# Patient Record
Sex: FEMALE | Race: WHITE | NOT HISPANIC OR LATINO | RURAL
[De-identification: names, ages, dates, MRNs, and addresses within clinical notes are randomized per-mention and may not be internally consistent; named-entity substitution may affect disease eponyms.]

---

## 2017-01-07 ENCOUNTER — OFFICE VISIT (OUTPATIENT)
Dept: RETAIL CLINIC | Facility: CLINIC | Age: 22
End: 2017-01-07

## 2017-01-07 VITALS
SYSTOLIC BLOOD PRESSURE: 130 MMHG | HEART RATE: 77 BPM | RESPIRATION RATE: 16 BRPM | DIASTOLIC BLOOD PRESSURE: 80 MMHG | HEIGHT: 61 IN | OXYGEN SATURATION: 98 % | BODY MASS INDEX: 33.04 KG/M2 | TEMPERATURE: 98.1 F | WEIGHT: 175 LBS

## 2017-01-07 DIAGNOSIS — J01.90 ACUTE SINUSITIS, RECURRENCE NOT SPECIFIED, UNSPECIFIED LOCATION: Primary | ICD-10-CM

## 2017-01-07 DIAGNOSIS — J06.9 ACUTE URI: ICD-10-CM

## 2017-01-07 DIAGNOSIS — R06.2 WHEEZE: ICD-10-CM

## 2017-01-07 PROCEDURE — 99213 OFFICE O/P EST LOW 20 MIN: CPT | Performed by: NURSE PRACTITIONER

## 2017-01-07 RX ORDER — ALBUTEROL SULFATE 90 UG/1
2 AEROSOL, METERED RESPIRATORY (INHALATION) EVERY 4 HOURS PRN
Qty: 18 G | Refills: 0 | Status: SHIPPED | OUTPATIENT
Start: 2017-01-07

## 2017-01-07 RX ORDER — AZITHROMYCIN 250 MG/1
TABLET, FILM COATED ORAL
Qty: 6 TABLET | Refills: 0 | Status: SHIPPED | OUTPATIENT
Start: 2017-01-07 | End: 2017-02-08

## 2017-01-07 RX ORDER — BROMPHENIRAMINE MALEATE, PSEUDOEPHEDRINE HYDROCHLORIDE, AND DEXTROMETHORPHAN HYDROBROMIDE 2; 30; 10 MG/5ML; MG/5ML; MG/5ML
SYRUP ORAL
Qty: 240 ML | Refills: 0 | Status: SHIPPED | OUTPATIENT
Start: 2017-01-07 | End: 2017-02-08

## 2017-01-07 NOTE — MR AVS SNAPSHOT
Kari Rader   1/7/2017 9:30 AM   Office Visit    Dept Phone:  [phone]   Encounter #:  [id number]    Provider:  TRACEY DELGADO   Department:  [facility]                Your Full Care Plan              Today's Medication Changes          These changes are accurate as of: 1/7/17 10:09 AM.  If you have any questions, ask your nurse or doctor.               New Medication(s)Ordered:     albuterol 108 (90 BASE) MCG/ACT inhaler   Commonly known as:  PROVENTIL HFA;VENTOLIN HFA   Inhale 2 puffs Every 4 (Four) Hours As Needed for wheezing.       azithromycin 250 MG tablet   Commonly known as:  ZITHROMAX Z-MARIAA   Take 2 tablets the first day, then 1 tablet daily for 4 days.       brompheniramine-pseudoephedrine-DM 30-2-10 MG/5ML syrup   2 tsp every 4-6 hours PRN cough         Stop taking medication(s)listed here:     MethylPREDNISolone 4 MG tablet   Commonly known as:  MEDROL (MARIAA)                Where to Get Your Medications      These medications [facility] to [facility] [address] [facility]  [phone] [facility] [phone]   [address], [facility] [address]     Phone:  [phone]     albuterol 108 (90 BASE) MCG/ACT inhaler    azithromycin 250 MG tablet    brompheniramine-pseudoephedrine-DM 30-2-10 MG/5ML syrup                  Your Updated Medication List          This list is accurate as of: 1/7/17 10:09 AM.  Always use your most recent med list.                albuterol 108 (90 BASE) MCG/ACT inhaler   Commonly known as:  PROVENTIL HFA;VENTOLIN HFA   Inhale 2 puffs Every 4 (Four) Hours As Needed for wheezing.       azithromycin 250 MG tablet   Commonly known as:  ZITHROMAX Z-MARIAA   Take 2 tablets the first day, then 1 tablet daily for 4 days.       brompheniramine-pseudoephedrine-DM 30-2-10 MG/5ML syrup   2 tsp every 4-6 hours PRN cough       fexofenadine-pseudoephedrine 180-240 MG per 24 hr tablet      Commonly known as:  ALLEGRA-D ALLERGY & CONGESTION   Take 1 tablet by mouth Daily.       hydrOXYzine 25 MG capsule   Commonly known as:  VISTARIL   1-2 caps po QHS prn itching               You Were Diagnosed With        Codes Comments    Acute sinusitis, recurrence not specified, unspecified location    -  Primary ICD-10-CM: J01.90  ICD-9-CM: 461.9     Acute URI     ICD-10-CM: J06.9  ICD-9-CM: 465.9     Wheeze     ICD-10-CM: R06.2  ICD-9-CM: 786.07       Instructions    Sinusitis, Adult  Sinusitis is redness, soreness, and inflammation of the paranasal sinuses. Paranasal sinuses are air pockets within the bones of your face. They are located beneath your eyes, in the middle of your forehead, and above your eyes. In healthy paranasal sinuses, mucus is able to drain out, and air is able to circulate through them by way of your nose. However, when your paranasal sinuses are inflamed, mucus and air can become trapped. This can allow bacteria and other germs to grow and cause infection.  Sinusitis can develop quickly and last only a short time (acute) or continue over a long period (chronic). Sinusitis that lasts for more than 12 weeks is considered chronic.  CAUSES  Causes of sinusitis include:  · Allergies.  · Structural abnormalities, such as displacement of the cartilage that separates your nostrils (deviated septum), which can decrease the air flow through your nose and sinuses and affect sinus drainage.  · Functional abnormalities, such as when the small hairs (cilia) that line your sinuses and help remove mucus do not work properly or are not present.  SIGNS AND SYMPTOMS  Symptoms of acute and chronic sinusitis are the same. The primary symptoms are pain and pressure around the affected sinuses. Other symptoms include:  · Upper toothache.  · Earache.  · Headache.  · Bad breath.  · Decreased sense of smell and taste.  · A cough, which worsens when you are lying flat.  · Fatigue.  · Fever.  · Thick drainage from  your nose, which often is green and may contain pus (purulent).  · Swelling and warmth over the affected sinuses.  DIAGNOSIS  Your health care provider will perform a physical exam. During your exam, your health care provider may perform any of the following to help determine if you have acute sinusitis or chronic sinusitis:  · Look in your nose for signs of abnormal growths in your nostrils (nasal polyps).  · Tap over the affected sinus to check for signs of infection.  · View the inside of your sinuses using an imaging device that has a light attached (endoscope).  If your health care provider suspects that you have chronic sinusitis, one or more of the following tests may be recommended:  · Allergy tests.  · Nasal culture. A sample of mucus is taken from your nose, sent to a lab, and screened for bacteria.  · Nasal cytology. A sample of mucus is taken from your nose and examined by your health care provider to determine if your sinusitis is related to an allergy.  TREATMENT  Most cases of acute sinusitis are related to a viral infection and will resolve on their own within 10 days. Sometimes, medicines are prescribed to help relieve symptoms of both acute and chronic sinusitis. These may include pain medicines, decongestants, nasal steroid sprays, or saline sprays.  However, for sinusitis related to a bacterial infection, your health care provider will prescribe antibiotic medicines. These are medicines that will help kill the bacteria causing the infection.  Rarely, sinusitis is caused by a fungal infection. In these cases, your health care provider will prescribe antifungal medicine.  For some cases of chronic sinusitis, surgery is needed. Generally, these are cases in which sinusitis recurs more than 3 times per year, despite other treatments.  HOME CARE INSTRUCTIONS  · Drink plenty of water. Water helps thin the mucus so your sinuses can drain more easily.  · Use a humidifier.  · Inhale steam 3-4 times a day  (for example, sit in the bathroom with the shower running).  · Apply a warm, moist washcloth to your face 3-4 times a day, or as directed by your health care provider.  · Use saline nasal sprays to help moisten and clean your sinuses.  · Take medicines only as directed by your health care provider.  · If you were prescribed either an antibiotic or antifungal medicine, finish it all even if you start to feel better.  SEEK IMMEDIATE MEDICAL CARE IF:  · You have increasing pain or severe headaches.  · You have nausea, vomiting, or drowsiness.  · You have swelling around your face.  · You have vision problems.  · You have a stiff neck.  · You have difficulty breathing.     This information is not intended to replace advice given to you by your health care provider. Make sure you discuss any questions you have with your health care provider.     Document Released: 2006 Document Revised: 2016 Document Reviewed: 2013  [employer] Interactive Patient Education © [employer]         Patient Instructions History      Upcoming Appointments     Visit Type Date Time Department    OFFICE VISIT 2017  9:30 AM [facility]      [employer] Signup     [employer] allows you to send messages to your doctor, view your test results, renew your prescriptions, schedule appointments, and more. To sign up, go to [employer] and click on the Sign Up Now link in the New User? box. Enter your MEEP Activation Code exactly as it appears below along with the last four digits of your Social Security Number and your Date of Birth () to complete the sign-up process. If you do not sign up before the expiration date, you must request a new code.    MEEP Activation Code: [email or web address]  Expires: 2017 10:09 AM    If you have questions, you can email [email or web address] or call [phone] to talk to our MEEP staff. Remember, MEEP is NOT to be used for urgent needs.  "For medical emergencies, dial 911.               Other Info from Your Visit           Allergies     Penicillins      Sulfa Antibiotics        Reason for Visit     Sinusitis cough shortness of breath wheezing ears sinus pressure       Vital Signs     Blood Pressure Pulse Temperature Respirations Height Weight    130/80 77 98.1 °F (36.7 °C) (Tympanic) 16 61\" (154.9 cm) 175 lb (79.4 kg)    Oxygen Saturation Breastfeeding? Body Mass Index Smoking Status          98% No 33.07 kg/m2 Never Smoker        Problems and Diagnoses Noted     Acute sinus infection    -  Primary    Acute upper respiratory infection        Wheeze            "

## 2017-01-07 NOTE — PROGRESS NOTES
"Subjective   Kari Rader is a 21 y.o. female.     Sinusitis   This is a new problem. The current episode started in the past 7 days. The problem has been gradually worsening since onset. Maximum temperature: unmeasured. Associated symptoms include congestion, coughing, ear pain (\"pressure\" bilaterally ), headaches, shortness of breath (with coughing episode), sinus pressure and a sore throat (\"a little\"). Pertinent negatives include no chills. Past treatments include nothing.        The following portions of the patient's history were reviewed and updated as appropriate: allergies, current medications, past family history, past medical history, past social history, past surgical history and problem list.    Review of Systems   Constitutional: Positive for fatigue (\"some\"). Negative for chills and fever.   HENT: Positive for congestion, ear pain (\"pressure\" bilaterally ), postnasal drip, rhinorrhea, sinus pressure and sore throat (\"a little\").    Respiratory: Positive for cough, chest tightness, shortness of breath (with coughing episode) and wheezing (\"mostly while at work\").    Cardiovascular: Negative for chest pain and palpitations.   Gastrointestinal: Negative for diarrhea, nausea and vomiting.   Musculoskeletal: Negative for arthralgias.   Neurological: Positive for headaches. Negative for dizziness.   Psychiatric/Behavioral: Negative for agitation and confusion.       Objective   Physical Exam   Constitutional: She is oriented to person, place, and time. Vital signs are normal. She appears well-developed and well-nourished. She is cooperative.   HENT:   Head: Normocephalic.   Right Ear: Hearing and tympanic membrane normal.   Left Ear: Hearing and tympanic membrane normal.   Nose: Mucosal edema present. Right sinus exhibits maxillary sinus tenderness and frontal sinus tenderness. Left sinus exhibits maxillary sinus tenderness and frontal sinus tenderness.   Mouth/Throat: Mucous membranes are normal. " "Posterior oropharyngeal erythema (Clear PND) present.   Eyes: Lids are normal. Pupils are equal, round, and reactive to light.   Neck: Normal range of motion.   Cardiovascular: Normal rate, regular rhythm and normal heart sounds.    Pulmonary/Chest: Effort normal and breath sounds normal. She has no wheezes. She has no rhonchi.   Abdominal: Soft. Normal appearance and bowel sounds are normal. There is no tenderness.   Lymphadenopathy:     She has no cervical adenopathy.   Neurological: She is alert and oriented to person, place, and time.   Skin: Skin is warm, dry and intact.   Patient appears to have white nits to hair shafts about 1/2 inch from scalp    Psychiatric: She has a normal mood and affect. Her speech is normal and behavior is normal.   Nursing note and vitals reviewed.    Visit Vitals   • /80   • Pulse 77   • Temp 98.1 °F (36.7 °C) (Tympanic)   • Resp 16   • Ht 61\" (154.9 cm)   • Wt 175 lb (79.4 kg)   • SpO2 98%   • Breastfeeding No   • BMI 33.07 kg/m2       Assessment/Plan   Kari was seen today for sinusitis.    Diagnoses and all orders for this visit:    Acute sinusitis, recurrence not specified, unspecified location  -     azithromycin (ZITHROMAX Z-MARIAA) 250 MG tablet; Take 2 tablets the first day, then 1 tablet daily for 4 days.    Acute URI  -     brompheniramine-pseudoephedrine-DM 30-2-10 MG/5ML syrup; 2 tsp every 4-6 hours PRN cough    Wheeze  -     albuterol (PROVENTIL HFA;VENTOLIN HFA) 108 (90 BASE) MCG/ACT inhaler; Inhale 2 puffs Every 4 (Four) Hours As Needed for wheezing.    Other orders  -     permethrin (NIX CREME RINSE) 1 % liquid; Apply  topically 1 (One) Time for 1 dose. Apply x 1,  may repeat in 9-10 days if needed      Tylenol and Ibuprofen OTC for mild to moderate pain and fever     · Drink plenty of water. Water helps thin the mucus so your sinuses can drain more easily.  · Use a cold/warm mist humidifier PRN  · Use saline nasal sprays to help moisten and clean your " sinuses.  · Take medicines as directed by your health care provider        Discharge Plan:     F/u with PCP in 3-4 days if symptoms worsen or do not improve.  Discussed diagnosis with patient. Verbalized understanding of dx and treatment plan     Patient instructions printed and given to patient     No work/ school noted needed.

## 2017-02-08 ENCOUNTER — OFFICE VISIT (OUTPATIENT)
Dept: RETAIL CLINIC | Facility: CLINIC | Age: 22
End: 2017-02-08

## 2017-02-08 VITALS
HEIGHT: 61 IN | OXYGEN SATURATION: 98 % | BODY MASS INDEX: 31.34 KG/M2 | RESPIRATION RATE: 18 BRPM | HEART RATE: 91 BPM | TEMPERATURE: 99.5 F | WEIGHT: 166 LBS

## 2017-02-08 DIAGNOSIS — K52.9 GASTROENTERITIS: Primary | ICD-10-CM

## 2017-02-08 PROCEDURE — 99213 OFFICE O/P EST LOW 20 MIN: CPT | Performed by: NURSE PRACTITIONER

## 2017-02-08 RX ORDER — PROMETHAZINE HYDROCHLORIDE 25 MG/1
TABLET ORAL
Qty: 12 TABLET | Refills: 0 | Status: SHIPPED | OUTPATIENT
Start: 2017-02-08

## 2017-02-08 RX ORDER — PROMETHAZINE HYDROCHLORIDE 25 MG/1
TABLET ORAL
Qty: 12 TABLET | Refills: 0 | Status: SHIPPED | OUTPATIENT
Start: 2017-02-08 | End: 2017-02-08

## 2017-02-08 NOTE — PROGRESS NOTES
"Subjective   Kari Rader is a 21 y.o. female.     Vomiting    This is a new problem. The current episode started yesterday. The problem occurs 2 to 4 times per day. The problem has been gradually improving. The emesis has an appearance of stomach contents. Maximum temperature: 99.5. Associated symptoms include abdominal pain ( generalized), chills, diarrhea ( watery), dizziness, a fever ( low grade) and sweats. Pertinent negatives include no arthralgias, chest pain, coughing, headaches, myalgias, URI or weight loss. Risk factors include ill contacts ( recently had stomach flu). She has tried nothing for the symptoms. Improvement on treatment: was able to keep down sips of sprite this am.        The following portions of the patient's history were reviewed and updated as appropriate: allergies, current medications, past medical history and past social history.    Review of Systems   Constitutional: Positive for chills and fever ( low grade). Negative for weight loss.   HENT: Positive for ear pain ( mild). Negative for congestion and sore throat.    Eyes: Negative.    Respiratory: Negative for cough, chest tightness and wheezing.    Cardiovascular: Negative.  Negative for chest pain.   Gastrointestinal: Positive for abdominal pain ( generalized), diarrhea ( watery), nausea and vomiting. Negative for blood in stool.   Musculoskeletal: Negative for arthralgias, myalgias, neck pain and neck stiffness.   Skin: Negative.    Neurological: Positive for dizziness. Negative for headaches.   Hematological: Negative for adenopathy.   Psychiatric/Behavioral: Negative.        Objective    Visit Vitals   • Pulse 91   • Temp 99.5 °F (37.5 °C) (Tympanic)   • Resp 18   • Ht 61\" (154.9 cm)   • Wt 166 lb (75.3 kg)   • LMP 01/21/2017 (Exact Date)   • SpO2 98%   • Breastfeeding No   • BMI 31.37 kg/m2       Physical Exam   Constitutional: She is oriented to person, place, and time. She appears well-developed. Distressed:  " does not appear to feel well.   HENT:   Head: Normocephalic and atraumatic.   Right Ear: Ear canal normal. A middle ear effusion ( small, clear fluid) is present.   Left Ear: Tympanic membrane and ear canal normal.   Mouth/Throat: Oropharynx is clear and moist.   Eyes: Conjunctivae are normal.   Neck: Normal range of motion. Neck supple.   Cardiovascular: Normal rate and regular rhythm.    Pulmonary/Chest: Effort normal and breath sounds normal.   Abdominal: Soft. There is tenderness ( generalized TTP, no localized pain, guarding or rebound).   Bowel sounds hyperactive     Lymphadenopathy:     She has no cervical adenopathy.   Neurological: She is alert and oriented to person, place, and time.   Psychiatric: She has a normal mood and affect. Her behavior is normal.   Nursing note and vitals reviewed.      Assessment/Plan   Kari was seen today for vomiting.    Diagnoses and all orders for this visit:    Gastroenteritis  -     Discontinue: promethazine (PHENERGAN) 25 MG tablet; 1/2 to 1 tab po every 6 hours prn n/v  -     promethazine (PHENERGAN) 25 MG tablet; 1/2 to 1 tab po every 6 hours prn n/v      Clear liquids advancing to bland, BRAT diet later this evening and continue until you have a normal BM.  Tylenol as needed    Urgent care or ER if symptoms persist/worsen     RTW: 2-9-17--call if one day extension is needed

## 2017-02-08 NOTE — PATIENT INSTRUCTIONS
Viral Gastroenteritis  Viral gastroenteritis is also known as stomach flu. This condition affects the stomach and intestinal tract. It can cause sudden diarrhea and vomiting. The illness typically lasts 3 to 8 days. Most people develop an immune response that eventually gets rid of the virus. While this natural response develops, the virus can make you quite ill.  CAUSES   Many different viruses can cause gastroenteritis, such as rotavirus or noroviruses. You can catch one of these viruses by consuming contaminated food or water. You may also catch a virus by sharing utensils or other personal items with an infected person or by touching a contaminated surface.  SYMPTOMS   The most common symptoms are diarrhea and vomiting. These problems can cause a severe loss of body fluids (dehydration) and a body salt (electrolyte) imbalance. Other symptoms may include:  · Fever.  · Headache.  · Fatigue.  · Abdominal pain.  DIAGNOSIS   Your caregiver can usually diagnose viral gastroenteritis based on your symptoms and a physical exam. A stool sample may also be taken to test for the presence of viruses or other infections.  TREATMENT   This illness typically goes away on its own. Treatments are aimed at rehydration. The most serious cases of viral gastroenteritis involve vomiting so severely that you are not able to keep fluids down. In these cases, fluids must be given through an intravenous line (IV).  HOME CARE INSTRUCTIONS   · Drink enough fluids to keep your urine clear or pale yellow. Drink small amounts of fluids frequently and increase the amounts as tolerated.  · Ask your caregiver for specific rehydration instructions.  · Avoid:    Foods high in sugar.    Alcohol.    Carbonated drinks.    Tobacco.    Juice.    Caffeine drinks.    Extremely hot or cold fluids.    Fatty, greasy foods.    Too much intake of anything at one time.    Dairy products until 24 to 48 hours after diarrhea stops.  · You may consume probiotics.  Probiotics are active cultures of beneficial bacteria. They may lessen the amount and number of diarrheal stools in adults. Probiotics can be found in yogurt with active cultures and in supplements.  · Wash your hands well to avoid spreading the virus.  · Only take over-the-counter or prescription medicines for pain, discomfort, or fever as directed by your caregiver. Do not give aspirin to children. Antidiarrheal medicines are not recommended.  · Ask your caregiver if you should continue to take your regular prescribed and over-the-counter medicines.  · Keep all follow-up appointments as directed by your caregiver.  SEEK IMMEDIATE MEDICAL CARE IF:   · You are unable to keep fluids down.  · You do not urinate at least once every 6 to 8 hours.  · You develop shortness of breath.  · You notice blood in your stool or vomit. This may look like coffee grounds.  · You have abdominal pain that increases or is concentrated in one small area (localized).  · You have persistent vomiting or diarrhea.  · You have a fever.  · The patient is a child younger than 3 months, and he or she has a fever.  · The patient is a child older than 3 months, and he or she has a fever and persistent symptoms.  · The patient is a child older than 3 months, and he or she has a fever and symptoms suddenly get worse.  · The patient is a baby, and he or she has no tears when crying.  MAKE SURE YOU:   · Understand these instructions.  · Will watch your condition.  · Will get help right away if you are not doing well or get worse.     This information is not intended to replace advice given to you by your health care provider. Make sure you discuss any questions you have with your health care provider.     Document Released: 12/18/2006 Document Revised: 03/11/2013 Document Reviewed: 10/03/2012  [employer] Interactive Patient Education ©2016 [employer]

## 2023-05-11 ENCOUNTER — TRANSCRIBE ORDERS (OUTPATIENT)
Dept: PHYSICAL THERAPY | Facility: HOSPITAL | Age: 28
End: 2023-05-11

## 2023-05-11 DIAGNOSIS — M54.40 BACK PAIN OF LUMBAR REGION WITH SCIATICA: Primary | ICD-10-CM

## 2023-06-05 ENCOUNTER — HOSPITAL ENCOUNTER (OUTPATIENT)
Dept: PHYSICAL THERAPY | Facility: HOSPITAL | Age: 28
Setting detail: THERAPIES SERIES
Discharge: HOME | End: 2023-06-05
Payer: COMMERCIAL

## 2023-06-05 DIAGNOSIS — M54.40 BACK PAIN OF LUMBAR REGION WITH SCIATICA: Primary | ICD-10-CM

## 2023-06-05 PROCEDURE — 97162 PT EVAL MOD COMPLEX 30 MIN: CPT | Performed by: PHYSICAL THERAPIST

## 2023-06-05 NOTE — THERAPY EVALUATION
Outpatient Physical Therapy Ortho Initial Evaluation  [facility]     Patient Name: Kari Rader  : 1995  MRN: [id number]  Today's Date: 2023      Visit Date: 2023    Patient seen for 1 PT sessions.  Patient reports N/A% of improvement.  Next MD appt: [facility].  Recertification: 2023.    Therapy Diagnosis: LBP/SI Mechanical Dysfunction         Past Medical History:   Diagnosis Date    Anxiety     Bell's palsy 2023    Depression         Past Surgical History:   Procedure Laterality Date    ABDOMINAL SURGERY         Visit Dx:     ICD-10-CM ICD-9-CM   1. Back pain of lumbar region with sciatica  M54.40 724.2     724.3          Patient History       Row Name 23 1500             History    Chief Complaint Pain  -AJ      Type of Pain Back pain  -AJ      Date Current Problem(s) Began --  Few months ago  -AJ      Brief Description of Current Complaint Patient reprots she had an incident where she fell out of her dad's truck and landed on her hip and back. She repts her back and and pain down l LE since then. She reprots it has been constant since then. She reprots most of it is LB, but then some in leg depending on what she is doing. She reprots she was in the ED because she collaped at work and they never did xrays or scans.  -AJ      Previous treatment for THIS PROBLEM Medication  -AJ      Patient/Caregiver Goals Relieve pain;Know what to do to help the symptoms  -AJ      Current Tobacco Use None  -AJ      Smoking Status NON-smoker  -AJ      Patient's Rating of General Health Good  -AJ      Occupation/sports/leisure activities Occupation: [employer]; Hobbies: fishing, hunting, gardening, spending time with kids  -AJ      What clinical tests have you had for this problem? --  None have been performed  -AJ      History of Previous Related Injuries Fall as described above. No issues prior  -AJ         Pain     Pain Location Back  -AJ      Pain at Present 6  -AJ      Pain at Best 3   -AJ      Pain at Worst 10  -AJ      Pain Frequency Constant/continuous  -AJ      Pain Description Throbbing;Burning;Pins and needles;Stabbing  -AJ      What Performance Factors Make the Current Problem(s) WORSE? Prolonged positions  -AJ      What Performance Factors Make the Current Problem(s) BETTER? keeping moving  -AJ      Tolerance Time- Standing long as keeop moving she is okay  -AJ      Tolerance Time- Sitting ~1 hour  -AJ      Is your sleep disturbed? Yes  -AJ      Is medication used to assist with sleep? Yes  -AJ                User Key  (r) = Recorded By, (t) = Taken By, (c) = Cosigned By      Initials Name Provider Type    AJ Silvana Rodriguez, PT DPT Physical Therapist                     PT Ortho       Row Name 06/05/23 1500       Subjective Comments    Subjective Comments see history  -AJ       Precautions and Contraindications    Precautions/Limitations no known precautions/limitations  -AJ       Subjective Pain    Able to rate subjective pain? yes  -AJ    Pre-Treatment Pain Level 6  -AJ       Posture/Observations    Alignment Options Forward head;Cervical lordosis;Thoracic kyphosis;Lumbar lordosis;Iliac crests  -AJ    Forward Head Normal  -AJ    Cervical Lordosis Normal  -AJ    Thoracic Kyphosis Mild;Increased  -AJ    Lumbar lordosis Normal  -AJ    Iliac crests Left:;Elevated;Posture in bed  L anterior ROt innonimate  -AJ    Posture/Observations Comments No distress, but appears uncomfortable. Poor overall posture awareness.  -       Sensory Screen for Light Touch- Lower Quarter Clearing    L1 (inguinal area) Bilateral:;Intact  -AJ    L2 (anterior mid thigh) Bilateral:;Intact  -AJ    L3 (distal anterior thigh) Bilateral:;Intact  -AJ    L4 (medial lower leg/foot) Bilateral:;Intact  -AJ    L5 (lateral lower leg/great toe) Bilateral:;Intact  -AJ       Lumbar/SI Special Tests    Standing Flexion Test (SI Dysfunction) Left:;Positive;Right:;Negative  -AJ    Stork Test (SI Dysfunction)  Left:;Positive;Right:;Negative  -AJ    Trendelenburg Test (Gluteus Medius Weakness) Bilateral:;Negative  -AJ    SLR (Neural Tension) Left:;Positive;Right:;Negative  -AJ    SI Compression Test (SI Dysfunction) Left:;Positive;Right:;Negative  -AJ    SI Distraction Test (SI Dysfunction) Left:;Positive;Right:;Negative  -AJ    NIECY (hip vs. SI Dysfunction) Left:;Positive;Right:;Negative  -AJ    FAIR Test (Piriformis Syndrome) Bilateral:;Negative  -AJ       Lumbosacral Palpation    SI Left:;Tender;Trigger point  -AJ    Lumbosacral Segment Tender  -AJ    Piriformis Left:;Tender;Guarded/taut  -AJ       Chester's Signs    Superficial and non-anatomical tenderness Positive  -AJ    Simulation test Positive  -AJ    Distraction straight leg raise test (sitting vs supine) Negative  -AJ    Regional disturbances Negative  -AJ    Overreaction to examination Positive  -AJ       Leg Length Test    Apparent Unequal;Right Higher Leg  -AJ       Head/Neck/Trunk    Trunk Extension AROM 10°  -AJ    Trunk Flexion AROM 70°  -AJ    Trunk Lt Lateral Flexion AROM 50% of range  -AJ    Trunk Rt Lateral Flexion AROM 50% of range  -AJ    Trunk Lt Rotation AROM 100% of range, WNL  -AJ    Trunk Rt Rotation AROM 100% of range, WNL  -AJ       MMT (Manual Muscle Testing)    General MMT Comments B hips 4+/5 and guarded due to pain. L Qs no tolerance to resistance due to LBP. R QS, B HS/DF 5/5.  -AJ       Sensation    Sensation WNL? WFL  -AJ    Light Touch No apparent deficits  -AJ    Additional Comments Does reports L LE rad pain to foot varying in intensity.  -AJ       Lower Extremity Flexibility    Hamstrings Right:;Mildly limited;Left:;Moderately limited  -AJ    Hip Flexors Bilateral:;Mildly limited  -AJ    LE Other Flexibility Right:;Mildly limited;Left:;Moderately limited  piriformis  -AJ       Pathomechanics    Spine Pathomechanics Excessive thoracic kyphosis with forward bend;Limited lumbar flattening with forward bend;Bends knees with attempted  "lumbar extension  -       Transfers    Comment, (Transfers) I with all transfers, appears uncomfortable, fidgets in sitting and absent log roll technique.  -       Gait/Stairs (Locomotion)    Rowan Level (Gait) independent  -    Pattern (Gait) step-through  -AJ    Deviations/Abnormal Patterns (Gait) antalgic;sincere decreased;gait speed decreased  -    Bilateral Gait Deviations forward flexed posture;heel strike decreased  -              User Key  (r) = Recorded By, (t) = Taken By, (c) = Cosigned By      Initials Name Provider Type    AJ Silvana Rodriguez, PT DPT Physical Therapist                                Therapy Education  Education Details: HEP: all exercises given today  Given: HEP, Symptoms/condition management, Pain management, Posture/body mechanics, Other (comment) (POC)  Program: New  How Provided: Verbal, Demonstration, Written  Provided to: Patient  Level of Understanding: Teach back education performed, Verbalized, Demonstrated      PT OP Goals       Row Name 06/05/23 1500          PT Short Term Goals    STG 1 I with HEP and have additions/changes by next recertification  -     STG 2 Patient able to show proper log roll technique.  -     STG 3 Patient to be more aware of posture and posture correction techniques  -     STG 4 AROM for lumbar extension >= 25°.  -     STG 5 AROM B Lumbar SB >= 75% of range.  -     STG 6 L Quads >= 4+/5.  -     STG 7 Patient able to perform 20 Bridges with UE \"X\" with no increase in pain.  -        Long Term Goals    LTG 1 Patient to have AROM for the lumbar spine all WNL, no increase in pain.  -     LTG 2 B LE 5/5 with no increase in pain.  -     LTG 3 Patient able to perform 10 minutes of standing core stab activities with good PN and no increase in pain.  -     LTG 4 Patient able to perform 10 minutes of seated DD core stab activities with good PN and no increase in pain.  -     LTG 5 Patient able to show proper lifting " "technique floor to waist with no increase in pain.  -     LTG 6 Patient able to show proper ergonomics for mopping/sweeping/vacuuming.  -     LTG 7 Patient to have level pelvis last 2 consecutive visits.  -     LTG 8 I with final HEP.  -        Time Calculation    PT Goal Re-Cert Due Date 06/26/23  -               User Key  (r) = Recorded By, (t) = Taken By, (c) = Cosigned By      Initials Name Provider Type    Silvana Gardner, PT DPT Physical Therapist                  Barriers to Rehab: Include significant or possible arthritic/degenerative changes that have occurred within the spine, The chronicity of this issue.    Safety Issues: None noted.        PT Assessment/Plan       Row Name 06/05/23 1500          PT Assessment    Functional Limitations Impaired gait;Limitation in home management;Limitations in community activities;Performance in leisure activities;Performance in self-care ADL;Performance in work activities  -     Impairments Gait;Impaired flexibility;Impaired muscle endurance;Impaired muscle length;Impaired muscle power;Impaired postural alignment;Joint mobility;Muscle strength;Pain;Poor body mechanics;Posture;Range of motion  -     Assessment Comments Patient is a normally developed 29yo female who presents to the clinic today with complaints of LBp after a fall. She rpeors it as midline awith pain down the L LE varying in intensity but all the way to he foot. She has limitatins in ROM, flexibility, neural etensibility, and SI alignment. She had a R anterior ROT innonimate making the R LE ~1\" short. Pelvic alignment did correct easily with HS MET. Patient's insurance does not allow treatment to begin on the initial evaluation. Small HEP was established.    Skilled PT with address deficits listed.  -     Rehab Potential Good  -     Patient/caregiver participated in establishment of treatment plan and goals Yes  -     Patient would benefit from skilled therapy intervention Yes " " -        PT Plan    PT Frequency 2x/week  -     Predicted Duration of Therapy Intervention (PT) 8-10 visits  -     Planned CPT's? PT EVAL MOD COMPLELITY: 97935;PT RE-EVAL: 20503;PT THER PROC EA 15 MIN: 17916;PT THER ACT EA 15 MIN: 75598;PT MANUAL THERAPY EA 15 MIN: 21626;PT NEUROMUSC RE-EDUCATION EA 15 MIN: 87148;PT SELF CARE/HOME MGMT/TRAIN EA 15: 96682;PT GAIT TRAINING EA 15 MIN: 48607;PT HOT OR COLD PACK TREAT MCARE;PT THER SUPP EA 15 MIN  -     PT Plan Comments Monitor pelvic alignment. Progress overall flexibiloity, ROM, strngth, core/SI stab, posture, ergonomics, and spinal protection.  -               User Key  (r) = Recorded By, (t) = Taken By, (c) = Cosigned By      Initials Name Provider Type    Silvana Gardner, PT DPT Physical Therapist                  Other therapeutic activities and/or exercises will be prescribed depending on the patient's progress or lack thereof.       OP Exercises       Row Name 06/05/23 1500             Subjective Comments    Subjective Comments see history  -         Subjective Pain    Able to rate subjective pain? yes  -      Pre-Treatment Pain Level 6  -         Exercise 1    Exercise Name 1 LTR  -      Reps 1 10  -      Time 1 10\" hold  -         Exercise 2    Exercise Name 2 B sitting piriformis S  -      Reps 2 1  -      Time 2 30 seconds  -                User Key  (r) = Recorded By, (t) = Taken By, (c) = Cosigned By      Initials Name Provider Type    Silvana Gardner, PT DPT Physical Therapist                  Manual Rx (last 36 hours)       Manual Treatments       Row Name 06/05/23 1500             Manual Rx 1    Manual Rx 1 Location R hamstrings  -      Manual Rx 1 Type MET  -      Manual Rx 1 Duration 5\" hold x3 reps  -                User Key  (r) = Recorded By, (t) = Taken By, (c) = Cosigned By      Initials Name Provider Type    Silvana Gardner, PT DPT Physical Therapist                   All therapeutic " interventions performed today were to address current functional limitations and/or deficits in addressing all physical therapy goals.               Outcome Measure Options: Modified Oswestry  Modified Oswestry  Modified Oswestry Score/Comments: 16/50 = 32%      Time Calculation:     Start Time: 1515  Stop Time: 1556  Time Calculation (min): 41 min     Therapy Charges for Today       Code Description Service Date Service Provider Modifiers Qty    56222366864 HC PT THER SUPP EA 15 MIN 6/5/2023 Silvana Rodriguez, PT DPT GP 1    63091319911 HC PT EVAL MOD COMPLEXITY 3 6/5/2023 Silvana Rodriguez, PT ALEXT GP 1            PT G-Codes  Outcome Measure Options: Modified Oswestry  Modified Oswestry Score/Comments: 16/50 = 32%       This document has been electronically signed by Silvana Rodriguez PT DPT, [facility] on June 5, 2023 16:09 CDT

## 2023-06-07 ENCOUNTER — HOSPITAL ENCOUNTER (OUTPATIENT)
Dept: PHYSICAL THERAPY | Facility: HOSPITAL | Age: 28
Setting detail: THERAPIES SERIES
Discharge: HOME | End: 2023-06-07
Payer: COMMERCIAL

## 2023-06-07 DIAGNOSIS — M54.40 BACK PAIN OF LUMBAR REGION WITH SCIATICA: Primary | ICD-10-CM

## 2023-06-07 PROCEDURE — 97110 THERAPEUTIC EXERCISES: CPT

## 2023-06-07 NOTE — THERAPY TREATMENT NOTE
Outpatient Physical Therapy Ortho Treatment Note  [facility]     Patient Name: Kari Rader  : 1995  MRN: [id number]  Today's Date: 2023    Pt seen for 2 PT sessions  Reported Improvement:  N/A %  MD Visit: TBD  Recheck Date: 2023    Therapy Diagnosis:  LBP/SI Mechanical Dysfunction         Visit Date: 2023    Visit Dx:    ICD-10-CM ICD-9-CM   1. Back pain of lumbar region with sciatica  M54.40 724.2     724.3       There is no problem list on file for this patient.       Past Medical History:   Diagnosis Date    Anxiety     Bell's palsy 2023    Depression         Past Surgical History:   Procedure Laterality Date    ABDOMINAL SURGERY          PT Ortho       Row Name 23 1400       Subjective Comments    Subjective Comments Back is a little sore and tender, just got off work.  -       Precautions and Contraindications    Precautions/Limitations no known precautions/limitations  -       Subjective Pain    Able to rate subjective pain? yes  -    Pre-Treatment Pain Level 5  -              User Key  (r) = Recorded By, (t) = Taken By, (c) = Cosigned By      Initials Name Provider Type    Barbara Castro PTA Physical Therapist Assistant                                 PT Assessment/Plan       Row Name 23 1400          PT Assessment    Assessment Comments Pt reports some tenderness at start of tx session.  Good effort with verbal and demonstrtion of exercises.  Pelvis alignment appears to be level this date. Receptive to log roll training.  Ice to go with a slight decrease in pain post tx.  -        PT Plan    PT Frequency 2x/week  -     PT Plan Comments Add B SLR next visit  -               User Key  (r) = Recorded By, (t) = Taken By, (c) = Cosigned By      Initials Name Provider Type    Barbara Castro PTA Physical Therapist Assistant                       OP Exercises       Row Name 23 1400             Subjective Comments    Subjective  "Comments Back is a little sore and tender, just got off work.  -JW         Subjective Pain    Able to rate subjective pain? yes  -JW      Pre-Treatment Pain Level 5  -JW      Post-Treatment Pain Level 4  -JW         Exercise 1    Exercise Name 1 Pro II LE bike for strength, posture re-ed and endurance  -JW      Time 1 10 min  -JW      Additional Comments L 4.0  -JW         Exercise 2    Exercise Name 2 B st HS st  -JW      Reps 2 2  -JW      Time 2 30  -JW         Exercise 3    Exercise Name 3 B seated piriformis st  -JW      Reps 3 2  -JW      Time 3 30  -JW         Exercise 4    Exercise Name 4 Sit to stands with Lumbar extension  -JW      Sets 4 2  -JW      Reps 4 10  -JW      Time 4 5\" hold in extension  -JW         Exercise 5    Exercise Name 5 QL st  -JW      Reps 5 2  -JW      Time 5 30  -JW         Exercise 6    Exercise Name 6 HL Hip ADD  -JW      Reps 6 20  -JW      Time 6 5\" hold  -JW         Exercise 7    Exercise Name 7 HL hip ABD  -JW      Sets 7 1  -JW      Reps 7 20  -JW      Time 7 5\" hold  -JW      Additional Comments GTB  -JW         Exercise 8    Exercise Name 8 PPT with TrA  -JW      Sets 8 1  -JW      Reps 8 20  -JW      Time 8 5\" hold  -JW         Exercise 9    Exercise Name 9 Sacral towel roll st  -JW      Time 9 5 min  -JW      Additional Comments vertical  -JW         Exercise 10    Exercise Name 10 LTR  -JW      Reps 10 10  -JW      Time 10 10  -JW         Exercise 11    Exercise Name 11 Log roll training  -JW                User Key  (r) = Recorded By, (t) = Taken By, (c) = Cosigned By      Initials Name Provider Type    Barbara Castro, PTA Physical Therapist Assistant                                  PT OP Goals       Row Name 06/07/23 1400          PT Short Term Goals    STG 1 I with HEP and have additions/changes by next recertification  -JW     STG 2 Patient able to show proper log roll technique.  -JW     STG 3 Patient to be more aware of posture and posture correction techniques  " "-     STG 4 AROM for lumbar extension >= 25°.  -     STG 5 AROM B Lumbar SB >= 75% of range.  -     STG 6 L Quads >= 4+/5.  -     STG 7 Patient able to perform 20 Bridges with UE \"X\" with no increase in pain.  -        Long Term Goals    LTG 1 Patient to have AROM for the lumbar spine all WNL, no increase in pain.  -     LTG 2 B LE 5/5 with no increase in pain.  -     LTG 3 Patient able to perform 10 minutes of standing core stab activities with good PN and no increase in pain.  -     LTG 4 Patient able to perform 10 minutes of seated DD core stab activities with good PN and no increase in pain.  -     LTG 5 Patient able to show proper lifting technique floor to waist with no increase in pain.  -     LTG 6 Patient able to show proper ergonomics for mopping/sweeping/vacuuming.  -     LTG 7 Patient to have level pelvis last 2 consecutive visits.  -     LTG 8 I with final HEP.  -        Time Calculation    PT Goal Re-Cert Due Date 06/26/23  -               User Key  (r) = Recorded By, (t) = Taken By, (c) = Cosigned By      Initials Name Provider Type    Barbara Castro PTA Physical Therapist Assistant                    Therapy Education  Education Details: Hip ABD/ADD  Given: HEP, Symptoms/condition management, Pain management, Posture/body mechanics  Program: New, Reinforced  How Provided: Verbal, Demonstration, Written  Provided to: Patient  Level of Understanding: Verbalized, Demonstrated              Time Calculation:   Start Time: 1427  Stop Time: 1515  Time Calculation (min): 48 min  Therapy Charges for Today       Code Description Service Date Service Provider Modifiers Qty    90598030729 HC PT THER PROC EA 15 MIN 6/7/2023 Barbara Jacobsen PTA GP, CQ 3    97362589926 HC PT THER SUPP EA 15 MIN 6/7/2023 Barbara Jacobsen PTA GP, CQ 1                      aBrbara Jacobsen PTA  6/7/2023     "

## 2023-06-15 ENCOUNTER — HOSPITAL ENCOUNTER (OUTPATIENT)
Dept: PHYSICAL THERAPY | Facility: HOSPITAL | Age: 28
Setting detail: THERAPIES SERIES
Discharge: HOME | End: 2023-06-15
Payer: COMMERCIAL

## 2023-06-15 DIAGNOSIS — M54.40 BACK PAIN OF LUMBAR REGION WITH SCIATICA: Primary | ICD-10-CM

## 2023-06-15 PROCEDURE — 97110 THERAPEUTIC EXERCISES: CPT | Performed by: PHYSICAL THERAPIST

## 2023-06-15 NOTE — THERAPY TREATMENT NOTE
"  Outpatient Physical Therapy Ortho Treatment Note  [facility]     Patient Name: Kari Rader  : 1995  MRN: [id number]  Today's Date: 6/15/2023      Visit Date: 06/15/2023    Patient seen for 3 PT sessions.  Patient reports N/A% of improvement.  Next MD appt: [facility].  Recertification: 2023.     Therapy Diagnosis: LBP/SI Mechanical Dysfunction    Visit Dx:    ICD-10-CM ICD-9-CM   1. Back pain of lumbar region with sciatica  M54.40 724.2     724.3       There is no problem list on file for this patient.       Past Medical History:   Diagnosis Date    Anxiety     Bell's palsy 2023    Depression         Past Surgical History:   Procedure Laterality Date    ABDOMINAL SURGERY          PT Ortho       Row Name 06/15/23 1500       Subjective Comments    Subjective Comments Patient rpeorts her back isn't too bad, but her L hip is bothering her some.  -       Precautions and Contraindications    Precautions/Limitations no known precautions/limitations  -       Subjective Pain    Able to rate subjective pain? yes  -    Pre-Treatment Pain Level 4  -    Post-Treatment Pain Level 4  \"A little better\"  -    Subjective Pain Comment L hip  -       Posture/Observations    Iliac crests Left:;Decreased;Posture in bed  L posterior innonimate ROT  -              User Key  (r) = Recorded By, (t) = Taken By, (c) = Cosigned By      Initials Name Provider Type    Silvana Gardner, PT DPT Physical Therapist                                 PT Assessment/Plan       Row Name 06/15/23 1500          PT Assessment    Assessment Comments Patient had unlevel pelvis today which corrected easily with hip flexor MET. Patient then performed core/SI stabalization exercises with good effort and no complaints. Patient maintained pelvic alignment post treatment.  -        PT Plan    PT Frequency 2x/week  -     PT Plan Comments COntinue to monitor pelvic alignment.  -               User Key  (r) = " "Recorded By, (t) = Taken By, (c) = Cosigned By      Initials Name Provider Type    Silvana Gardner, PT DPT Physical Therapist                       OP Exercises       Row Name 06/15/23 1500             Subjective Comments    Subjective Comments Patient rpeorts her back isn't too bad, but her L hip is bothering her some.  -AJ         Subjective Pain    Able to rate subjective pain? yes  -AJ      Pre-Treatment Pain Level 4  -AJ      Post-Treatment Pain Level 4  \"A little better\"  -AJ      Subjective Pain Comment L hip  -AJ         Exercise 1    Exercise Name 1 Pro II LE bike for strength, posture re-ed and endurance  -AJ      Time 1 10 min  -AJ      Additional Comments L 5.0  -AJ         Exercise 2    Exercise Name 2 B st HS st  -AJ      Reps 2 2  -AJ      Time 2 30 seconds  -AJ         Exercise 3    Exercise Name 3 B seated piriformis st  -AJ      Reps 3 2  -AJ      Time 3 30 seconds  -AJ         Exercise 4    Exercise Name 4 B seated QL S  -AJ      Reps 4 2  -AJ      Time 4 30 seconds  -AJ         Exercise 5    Exercise Name 5 alignment check  -AJ      Additional Comments Unlevel- L posterior ROT  -AJ         Exercise 6    Exercise Name 6 manual-MET  -AJ      Reps 6 3  -AJ      Time 6 5\" hold  -AJ      Additional Comments L hip flexor- corrected to level  -AJ         Exercise 7    Exercise Name 7 HL B hip add  -AJ      Sets 7 1  -AJ      Reps 7 20  -AJ      Time 7 5\" hold  -AJ         Exercise 8    Exercise Name 8 Bridges with add  -AJ      Sets 8 2  -AJ      Reps 8 10  -AJ      Time 8 5\" hold  -AJ         Exercise 9    Exercise Name 9 Prone TKE with GS  -AJ      Sets 9 1  -AJ      Reps 9 20  -AJ      Time 9 5\" hold  -AJ         Exercise 10    Exercise Name 10 Prone Hip IR with RTB  -AJ      Sets 10 2  -AJ      Reps 10 10  -AJ      Time 10 5\" hold  -AJ      Additional Comments RTB  -AJ         Exercise 11    Exercise Name 11 Prone heel squeezes  -AJ      Sets 11 1  -AJ      Reps 11 20  -AJ      Time 11 5\" " "hold  -         Exercise 12    Exercise Name 12 Sacral towel S  -      Time 12 5 min  -      Additional Comments vertical  -         Exercise 13    Exercise Name 13 LTR  -      Sets 13 1  -      Reps 13 10  -      Time 13 10\" hold  -         Exercise 14    Exercise Name 14 alignment recheck  -      Additional Comments level pelvis  -                User Key  (r) = Recorded By, (t) = Taken By, (c) = Cosigned By      Initials Name Provider Type    Silvana Gardner, PT DPT Physical Therapist                All therapeutic interventions performed today were to address current functional limitations and/or deficits in addressing all physical therapy goals.                    PT OP Goals       Row Name 06/15/23 1500          PT Short Term Goals    STG 1 I with HEP and have additions/changes by next recertification  -     STG 2 Patient able to show proper log roll technique.  -     STG 3 Patient to be more aware of posture and posture correction techniques  -     STG 4 AROM for lumbar extension >= 25°.  -     STG 5 AROM B Lumbar SB >= 75% of range.  -     STG 6 L Quads >= 4+/5.  -     STG 7 Patient able to perform 20 Bridges with UE \"X\" with no increase in pain.  -        Long Term Goals    LTG 1 Patient to have AROM for the lumbar spine all WNL, no increase in pain.  -     LTG 2 B LE 5/5 with no increase in pain.  -     LTG 3 Patient able to perform 10 minutes of standing core stab activities with good PN and no increase in pain.  -     LTG 4 Patient able to perform 10 minutes of seated DD core stab activities with good PN and no increase in pain.  -     LTG 5 Patient able to show proper lifting technique floor to waist with no increase in pain.  -     LTG 6 Patient able to show proper ergonomics for mopping/sweeping/vacuuming.  -     LTG 7 Patient to have level pelvis last 2 consecutive visits.  -     LTG 8 I with final HEP.  -        Time Calculation    PT Goal " Re-Cert Due Date 06/26/23  -ERIKA               User Key  (r) = Recorded By, (t) = Taken By, (c) = Cosigned By      Initials Name Provider Type    Silvana Gardner, PT DPT Physical Therapist                                   Time Calculation:   Start Time: 1507  Stop Time: 1603  Time Calculation (min): 56 min  Total Timed Code Minutes- PT: 56 minute(s)  Therapy Charges for Today       Code Description Service Date Service Provider Modifiers Qty    [id number]  PT THER SUPP EA 15 MIN 6/15/2023 Silvana Rodriguez, PT DPT GP 1    [id number]  PT THER PROC EA 15 MIN 6/15/2023 Silvana Rodriguez, PT DPT GP 4                    This document has been electronically signed by Silvana Rodriguez PT [employer], [facility] on Amy 15, 2023 16:16 CDT